# Patient Record
Sex: FEMALE | Race: WHITE | URBAN - METROPOLITAN AREA
[De-identification: names, ages, dates, MRNs, and addresses within clinical notes are randomized per-mention and may not be internally consistent; named-entity substitution may affect disease eponyms.]

---

## 2023-07-20 ENCOUNTER — OFFICE VISIT (OUTPATIENT)
Dept: URBAN - METROPOLITAN AREA CLINIC 22 | Facility: CLINIC | Age: 73
End: 2023-07-20
Payer: MEDICARE

## 2023-07-20 DIAGNOSIS — H35.373 PUCKERING OF MACULA, BILATERAL: ICD-10-CM

## 2023-07-20 DIAGNOSIS — H20.012 PRIMARY IRIDOCYCLITIS, LEFT EYE: Primary | ICD-10-CM

## 2023-07-20 PROCEDURE — 99204 OFFICE O/P NEW MOD 45 MIN: CPT | Performed by: STUDENT IN AN ORGANIZED HEALTH CARE EDUCATION/TRAINING PROGRAM

## 2023-07-20 PROCEDURE — 92134 CPTRZ OPH DX IMG PST SGM RTA: CPT | Performed by: STUDENT IN AN ORGANIZED HEALTH CARE EDUCATION/TRAINING PROGRAM

## 2023-07-20 RX ORDER — PREDNISOLONE ACETATE 10 MG/ML
1 % SUSPENSION/ DROPS OPHTHALMIC
Qty: 5 | Refills: 1 | Status: ACTIVE
Start: 2023-07-20

## 2023-07-20 ASSESSMENT — INTRAOCULAR PRESSURE
OS: 10
OD: 10

## 2023-07-20 NOTE — IMPRESSION/PLAN
Impression: Primary iridocyclitis, left eye: H20.012. Plan: Discussed findings and educated patient on condition. First occurrence per patient. Denies any autoimmune dz, trauma. Rx pred QID OS. Patient refused DFE today, prefers to have . RTC 3-4 days for follow-up with DFE (pt states wife will drive). Contact clinic sooner if symptoms persist/worsen.

## 2023-07-20 NOTE — IMPRESSION/PLAN
Impression: Puckering of macula, bilateral: H35.373. Plan: See above re: DFE. OCT mac ordered d/t ERM, reduced VA OS: ERM OS>OD. Further eval at next visit.